# Patient Record
Sex: MALE | Race: WHITE | NOT HISPANIC OR LATINO | Employment: OTHER | URBAN - METROPOLITAN AREA
[De-identification: names, ages, dates, MRNs, and addresses within clinical notes are randomized per-mention and may not be internally consistent; named-entity substitution may affect disease eponyms.]

---

## 2017-02-20 ENCOUNTER — CLINICAL SUPPORT NO REQUIREMENTS (OUTPATIENT)
Dept: CARDIOLOGY | Facility: CLINIC | Age: 62
End: 2017-02-20

## 2017-02-20 ENCOUNTER — OFFICE VISIT (OUTPATIENT)
Dept: CARDIOLOGY | Facility: CLINIC | Age: 62
End: 2017-02-20

## 2017-02-20 VITALS
HEART RATE: 70 BPM | BODY MASS INDEX: 24.3 KG/M2 | DIASTOLIC BLOOD PRESSURE: 74 MMHG | HEIGHT: 78 IN | SYSTOLIC BLOOD PRESSURE: 114 MMHG | WEIGHT: 210 LBS

## 2017-02-20 DIAGNOSIS — Q87.40 MARFAN SYNDROME: ICD-10-CM

## 2017-02-20 DIAGNOSIS — R00.2 PALPITATIONS: ICD-10-CM

## 2017-02-20 DIAGNOSIS — I47.1 SUPRAVENTRICULAR TACHYCARDIA (HCC): Primary | ICD-10-CM

## 2017-02-20 DIAGNOSIS — Z95.818 STATUS POST PLACEMENT OF IMPLANTABLE LOOP RECORDER: Primary | ICD-10-CM

## 2017-02-20 PROCEDURE — 99213 OFFICE O/P EST LOW 20 MIN: CPT | Performed by: INTERNAL MEDICINE

## 2017-02-20 PROCEDURE — 93291 INTERROG DEV EVAL SCRMS IP: CPT | Performed by: INTERNAL MEDICINE

## 2017-02-20 NOTE — PROGRESS NOTES
" Subjective:       Bipin Green Jr. is a 61 y.o. male who here for follow up    CC  Palpitations  HPI  61-year-old white male with a history of palpitation, SVT, has a loop recorder which showed 10 minutes episode of SVT, with mild shortness of breath and palpitation, denies any chest pains or tightness in chest, SVT happened approximately 3-4 months ago     Problem List Items Addressed This Visit        Cardiovascular and Mediastinum    Supraventricular tachycardia - Primary    Relevant Orders    Treadmill Stress Test    Palpitations    Relevant Orders    Treadmill Stress Test       Other    Marfan syndrome    Relevant Orders    Treadmill Stress Test        Previous treatments/evaluations include: ASA and beta blocker. Cardiac risk factors: advanced age (older than 55 for men, 65 for women) and hypertension.    The following portions of the patient's history were reviewed and updated as appropriate: allergies, current medications, past family history, past medical history, past social history, past surgical history and problem list.    Past Medical History   Diagnosis Date   • History of loop recorder    • Marfan syndrome    • Palpitations    • PSVT (paroxysmal supraventricular tachycardia)    • PVC (premature ventricular contraction)    • SSS (sick sinus syndrome)    • Syncope     reports that he has never smoked. He has never used smokeless tobacco. He reports that he does not drink alcohol or use illicit drugs.  Family History   Problem Relation Age of Onset   • Family history unknown: Yes       Review of Systems  Constitutional: No wt loss, fever, fatigue  Gastrointestinal: No nausea, abdominal pain  Behavioral/Psych: No insomnia or anxiety   Cardiovascular palpitation  Objective:       Physical Exam             Physical Exam  Visit Vitals   • /74   • Pulse 70   • Ht 81\" (205.7 cm)   • Wt 210 lb (95.3 kg)   • BMI 22.5 kg/m2       General appearance: NAD, conversant   Eyes: anicteric sclerae, moist " conjunctivae; no lid-lag; PERRLA   HENT: Atraumatic; oropharynx clear with moist mucous membranes and no mucosal ulcerations;  normal hard and soft palate   Neck: Trachea midline; FROM, supple, no thyromegaly or lymphadenopathy   Lungs: CTA, with normal respiratory effort and no intercostal retractions   CV: S1-S2 regular, no murmurs, no rub, no gallop   Abdomen: Soft, non-tender; no masses or HSM   Extremities: No peripheral edema or extremity lymphadenopathy  Skin: Normal temperature, turgor and texture; no rash, ulcers or subcutaneous nodules   Psych: Appropriate affect, alert and oriented to person, place and time           Cardiographics  @Procedures    Echocardiogram:        Current Outpatient Prescriptions:   •  aspirin 81 MG tablet, Take  by mouth daily., Disp: , Rfl:   •  metoprolol succinate XL (TOPROL-XL) 25 MG 24 hr tablet, Take 1 tablet by mouth daily., Disp: 30 tablet, Rfl: 6  •  nitroglycerin (NITROSTAT) 0.4 MG SL tablet, Place  under the tongue., Disp: , Rfl:    Assessment:        Patient Active Problem List   Diagnosis   • Abnormal electrocardiogram   • Cardiac arrhythmia   • Supraventricular tachycardia   • Syncope   • PSVT (paroxysmal supraventricular tachycardia)   • Marfan syndrome   • Palpitations   There is no dilatation of the ascending aorta.   Conclusions  Borderline concentric left ventricular hypertrophy is observed.  The estimated ejection fraction is 60-65%.   Abnormal left ventricular diastolic filling is observed, consistent with  Grade 1 (impaired LV relaxation).  Trace to mild aortic regurgitation is present.  There is trace to mild mitral regurgitation observed.  There is trace to mild tricuspid regurgitation present.  There is no pericardial effusion.         CONCLUSION: Normal Lexiscan Cardiolite with no evidence of previous myocardial  infarction or the redistribution of ischemia with the ejection fraction of  close to approximately 60%           Plan:            ICD-10-CM  ICD-9-CM   1. Supraventricular tachycardia I47.1 427.89   2. Palpitations R00.2 785.1   3. Marfan syndrome Q87.40 759.82     ett for exercise induced arr.    See us 6 month    Loop recorder showed 10 mg SVT    IF epoisodes worse may need ablation  COUNSELING:    Bipin Green Jr.Counseling was given to patient for the following topics: diagnostic results, risk factor reductions, impressions, risks and benefits of treatment options and importance of treatment compliance .       SMOKING COUNSELING:    Counseling given: Not Answered      EMR Dragon/Transcription disclaimer:   Much of this encounter note is an electronic transcription/translation of spoken language to printed text. The electronic translation of spoken language may permit erroneous, or at times, nonsensical words or phrases to be inadvertently transcribed; Although I have reviewed the note for such errors, some may still exist.

## 2017-02-22 DIAGNOSIS — R94.31 ABNORMAL EKG: Primary | ICD-10-CM

## 2017-02-22 DIAGNOSIS — R00.2 PALPITATIONS: ICD-10-CM

## 2017-02-22 DIAGNOSIS — I47.1 SUPRAVENTRICULAR TACHYCARDIA (HCC): ICD-10-CM

## 2017-02-22 DIAGNOSIS — Q87.40 MARFAN SYNDROME: ICD-10-CM

## 2017-02-23 ENCOUNTER — APPOINTMENT (OUTPATIENT)
Dept: CARDIOLOGY | Facility: HOSPITAL | Age: 62
End: 2017-02-23
Attending: INTERNAL MEDICINE

## 2017-02-27 ENCOUNTER — HOSPITAL ENCOUNTER (OUTPATIENT)
Dept: CARDIOLOGY | Facility: HOSPITAL | Age: 62
Discharge: HOME | End: 2017-02-27
Attending: INTERNAL MEDICINE | Admitting: INTERNAL MEDICINE

## 2017-02-27 ENCOUNTER — APPOINTMENT (OUTPATIENT)
Dept: CARDIOLOGY | Facility: HOSPITAL | Age: 62
End: 2017-02-27
Attending: INTERNAL MEDICINE

## 2017-02-27 VITALS
HEART RATE: 74 BPM | DIASTOLIC BLOOD PRESSURE: 64 MMHG | HEIGHT: 78 IN | OXYGEN SATURATION: 98 % | WEIGHT: 210 LBS | RESPIRATION RATE: 18 BRPM | SYSTOLIC BLOOD PRESSURE: 114 MMHG | BODY MASS INDEX: 24.3 KG/M2

## 2017-02-27 DIAGNOSIS — I47.1 SUPRAVENTRICULAR TACHYCARDIA (HCC): ICD-10-CM

## 2017-02-27 DIAGNOSIS — Q87.40 MARFAN SYNDROME: ICD-10-CM

## 2017-02-27 DIAGNOSIS — R00.2 PALPITATIONS: ICD-10-CM

## 2017-02-27 DIAGNOSIS — R94.31 ABNORMAL EKG: ICD-10-CM

## 2017-02-27 PROCEDURE — 93016 CV STRESS TEST SUPVJ ONLY: CPT | Performed by: INTERNAL MEDICINE

## 2017-02-27 PROCEDURE — 93018 CV STRESS TEST I&R ONLY: CPT | Performed by: INTERNAL MEDICINE

## 2017-02-27 PROCEDURE — 93017 CV STRESS TEST TRACING ONLY: CPT

## 2017-02-28 LAB
BH CV STRESS BP STAGE 1: NORMAL
BH CV STRESS BP STAGE 2: NORMAL
BH CV STRESS BP STAGE 3: NORMAL
BH CV STRESS DURATION MIN STAGE 1: 3
BH CV STRESS DURATION MIN STAGE 2: 3
BH CV STRESS DURATION MIN STAGE 3: 0
BH CV STRESS DURATION SEC STAGE 1: 0
BH CV STRESS DURATION SEC STAGE 2: 0
BH CV STRESS DURATION SEC STAGE 3: 15
BH CV STRESS GRADE STAGE 1: 10
BH CV STRESS GRADE STAGE 2: 12
BH CV STRESS GRADE STAGE 3: 14
BH CV STRESS HR STAGE 1: 123
BH CV STRESS HR STAGE 2: 150
BH CV STRESS HR STAGE 3: 153
BH CV STRESS METS STAGE 1: 4.6
BH CV STRESS METS STAGE 2: 7
BH CV STRESS METS STAGE 3: 7.3
BH CV STRESS O2 STAGE 3: 99
BH CV STRESS PROTOCOL 1: NORMAL
BH CV STRESS RECOVERY BP: NORMAL MMHG
BH CV STRESS RECOVERY HR: 83 BPM
BH CV STRESS RECOVERY O2: 98 %
BH CV STRESS SPEED STAGE 1: 1.7
BH CV STRESS SPEED STAGE 2: 2.5
BH CV STRESS SPEED STAGE 3: 3.4
BH CV STRESS STAGE 1: 1
BH CV STRESS STAGE 2: 2
BH CV STRESS STAGE 3: 3
MAXIMAL PREDICTED HEART RATE: 158 BPM
PERCENT MAX PREDICTED HR: 96.84 %
STRESS BASELINE BP: NORMAL MMHG
STRESS BASELINE HR: 76 BPM
STRESS O2 SAT REST: 98 %
STRESS PERCENT HR: 114 %
STRESS POST ESTIMATED WORKLOAD: 7.3 METS
STRESS POST EXERCISE DUR MIN: 6 MIN
STRESS POST EXERCISE DUR SEC: 15 SEC
STRESS POST O2 SAT PEAK: 99 %
STRESS POST PEAK BP: NORMAL MMHG
STRESS POST PEAK HR: 153 BPM
STRESS TARGET HR: 134 BPM

## 2017-05-28 ENCOUNTER — CLINICAL SUPPORT NO REQUIREMENTS (OUTPATIENT)
Dept: CARDIOLOGY | Facility: CLINIC | Age: 62
End: 2017-05-28

## 2017-05-28 DIAGNOSIS — Z95.818 PRESENCE OF ELECTRONIC CARDIAC DEVICE: Primary | ICD-10-CM

## 2017-05-28 PROCEDURE — 93299 PR REM INTERROG ICPMS/SCRMS <30 D TECH REVIEW: CPT | Performed by: INTERNAL MEDICINE

## 2017-05-28 PROCEDURE — 93298 REM INTERROG DEV EVAL SCRMS: CPT | Performed by: INTERNAL MEDICINE

## 2017-05-30 RX ORDER — METOPROLOL SUCCINATE 50 MG/1
50 TABLET, EXTENDED RELEASE ORAL DAILY
Qty: 30 TABLET | Refills: 11 | Status: SHIPPED | OUTPATIENT
Start: 2017-05-30 | End: 2018-06-06 | Stop reason: SDUPTHER

## 2017-05-30 RX ORDER — METOPROLOL SUCCINATE 50 MG/1
TABLET, EXTENDED RELEASE ORAL
Qty: 30 TABLET | Refills: 0 | Status: SHIPPED | OUTPATIENT
Start: 2017-05-30 | End: 2017-05-30 | Stop reason: SDUPTHER

## 2017-07-11 ENCOUNTER — CLINICAL SUPPORT NO REQUIREMENTS (OUTPATIENT)
Dept: CARDIOLOGY | Facility: CLINIC | Age: 62
End: 2017-07-11

## 2017-07-11 DIAGNOSIS — Z95.818 STATUS POST PLACEMENT OF IMPLANTABLE LOOP RECORDER: Primary | ICD-10-CM

## 2017-07-11 PROCEDURE — 93298 REM INTERROG DEV EVAL SCRMS: CPT | Performed by: INTERNAL MEDICINE

## 2017-07-11 PROCEDURE — 93299 PR REM INTERROG ICPMS/SCRMS <30 D TECH REVIEW: CPT | Performed by: INTERNAL MEDICINE

## 2017-08-10 ENCOUNTER — CLINICAL SUPPORT NO REQUIREMENTS (OUTPATIENT)
Dept: CARDIOLOGY | Facility: CLINIC | Age: 62
End: 2017-08-10

## 2017-08-10 DIAGNOSIS — Z95.818 STATUS POST PLACEMENT OF IMPLANTABLE LOOP RECORDER: Primary | ICD-10-CM

## 2017-08-10 PROCEDURE — 93298 REM INTERROG DEV EVAL SCRMS: CPT | Performed by: INTERNAL MEDICINE

## 2017-08-10 PROCEDURE — 93299 PR REM INTERROG ICPMS/SCRMS <30 D TECH REVIEW: CPT | Performed by: INTERNAL MEDICINE

## 2017-09-15 ENCOUNTER — CLINICAL SUPPORT NO REQUIREMENTS (OUTPATIENT)
Dept: CARDIOLOGY | Facility: CLINIC | Age: 62
End: 2017-09-15

## 2017-09-15 DIAGNOSIS — Z95.818 PRESENCE OF CARDIAC DEVICE: Primary | ICD-10-CM

## 2017-09-15 PROCEDURE — 93298 REM INTERROG DEV EVAL SCRMS: CPT | Performed by: INTERNAL MEDICINE

## 2017-11-03 ENCOUNTER — CLINICAL SUPPORT NO REQUIREMENTS (OUTPATIENT)
Dept: CARDIOLOGY | Facility: CLINIC | Age: 62
End: 2017-11-03

## 2017-11-03 DIAGNOSIS — Z95.818 PRESENCE OF CARDIAC DEVICE: Primary | ICD-10-CM

## 2017-11-03 PROCEDURE — 93298 REM INTERROG DEV EVAL SCRMS: CPT | Performed by: INTERNAL MEDICINE

## 2017-12-03 ENCOUNTER — CLINICAL SUPPORT NO REQUIREMENTS (OUTPATIENT)
Dept: CARDIOLOGY | Facility: CLINIC | Age: 62
End: 2017-12-03

## 2017-12-03 DIAGNOSIS — Z95.818 PRESENCE OF CARDIAC DEVICE: Primary | ICD-10-CM

## 2017-12-03 PROCEDURE — 93298 REM INTERROG DEV EVAL SCRMS: CPT | Performed by: INTERNAL MEDICINE

## 2018-01-02 ENCOUNTER — CLINICAL SUPPORT NO REQUIREMENTS (OUTPATIENT)
Dept: CARDIOLOGY | Facility: CLINIC | Age: 63
End: 2018-01-02

## 2018-01-02 DIAGNOSIS — Z95.818 STATUS POST PLACEMENT OF IMPLANTABLE LOOP RECORDER: Primary | ICD-10-CM

## 2018-01-02 PROCEDURE — 93299 PR REM INTERROG ICPMS/SCRMS <30 D TECH REVIEW: CPT | Performed by: INTERNAL MEDICINE

## 2018-01-02 PROCEDURE — 93298 REM INTERROG DEV EVAL SCRMS: CPT | Performed by: INTERNAL MEDICINE

## 2018-02-01 ENCOUNTER — CLINICAL SUPPORT NO REQUIREMENTS (OUTPATIENT)
Dept: CARDIOLOGY | Facility: CLINIC | Age: 63
End: 2018-02-01

## 2018-02-01 DIAGNOSIS — Z95.818 STATUS POST PLACEMENT OF IMPLANTABLE LOOP RECORDER: Primary | ICD-10-CM

## 2018-02-01 PROCEDURE — 93299 PR REM INTERROG ICPMS/SCRMS <30 D TECH REVIEW: CPT | Performed by: INTERNAL MEDICINE

## 2018-02-01 PROCEDURE — 93298 REM INTERROG DEV EVAL SCRMS: CPT | Performed by: INTERNAL MEDICINE

## 2018-02-05 PROBLEM — Z95.818 STATUS POST PLACEMENT OF IMPLANTABLE LOOP RECORDER: Status: ACTIVE | Noted: 2018-02-05

## 2018-03-03 ENCOUNTER — CLINICAL SUPPORT NO REQUIREMENTS (OUTPATIENT)
Dept: CARDIOLOGY | Facility: CLINIC | Age: 63
End: 2018-03-03

## 2018-03-03 DIAGNOSIS — Z95.818 STATUS POST PLACEMENT OF IMPLANTABLE LOOP RECORDER: Primary | ICD-10-CM

## 2018-03-03 PROCEDURE — 93299 PR REM INTERROG ICPMS/SCRMS <30 D TECH REVIEW: CPT | Performed by: INTERNAL MEDICINE

## 2018-03-03 PROCEDURE — 93298 REM INTERROG DEV EVAL SCRMS: CPT | Performed by: INTERNAL MEDICINE

## 2018-04-06 ENCOUNTER — CLINICAL SUPPORT NO REQUIREMENTS (OUTPATIENT)
Dept: CARDIOLOGY | Facility: CLINIC | Age: 63
End: 2018-04-06

## 2018-04-06 DIAGNOSIS — Z95.818 STATUS POST PLACEMENT OF IMPLANTABLE LOOP RECORDER: Primary | ICD-10-CM

## 2018-04-06 PROCEDURE — 93298 REM INTERROG DEV EVAL SCRMS: CPT | Performed by: INTERNAL MEDICINE

## 2018-04-06 PROCEDURE — 93299 PR REM INTERROG ICPMS/SCRMS <30 D TECH REVIEW: CPT | Performed by: INTERNAL MEDICINE

## 2018-05-06 ENCOUNTER — CLINICAL SUPPORT NO REQUIREMENTS (OUTPATIENT)
Dept: CARDIOLOGY | Facility: CLINIC | Age: 63
End: 2018-05-06

## 2018-05-06 DIAGNOSIS — Z45.09 ENCOUNTER FOR LOOP RECORDER CHECK: Primary | ICD-10-CM

## 2018-05-06 PROCEDURE — 93298 REM INTERROG DEV EVAL SCRMS: CPT | Performed by: INTERNAL MEDICINE

## 2018-05-06 PROCEDURE — 93299 PR REM INTERROG ICPMS/SCRMS <30 D TECH REVIEW: CPT | Performed by: INTERNAL MEDICINE

## 2018-05-07 PROBLEM — Z45.09 ENCOUNTER FOR LOOP RECORDER CHECK: Status: ACTIVE | Noted: 2018-05-07

## 2018-06-04 RX ORDER — METOPROLOL SUCCINATE 50 MG/1
TABLET, EXTENDED RELEASE ORAL
Qty: 30 TABLET | Refills: 11 | OUTPATIENT
Start: 2018-06-04

## 2018-06-05 ENCOUNTER — CLINICAL SUPPORT NO REQUIREMENTS (OUTPATIENT)
Dept: CARDIOLOGY | Facility: CLINIC | Age: 63
End: 2018-06-05

## 2018-06-05 DIAGNOSIS — Z95.818 STATUS POST PLACEMENT OF IMPLANTABLE LOOP RECORDER: Primary | ICD-10-CM

## 2018-06-05 PROCEDURE — 93298 REM INTERROG DEV EVAL SCRMS: CPT | Performed by: INTERNAL MEDICINE

## 2018-06-05 PROCEDURE — 93299 PR REM INTERROG ICPMS/SCRMS <30 D TECH REVIEW: CPT | Performed by: INTERNAL MEDICINE

## 2018-06-06 RX ORDER — METOPROLOL SUCCINATE 50 MG/1
TABLET, EXTENDED RELEASE ORAL
Qty: 30 TABLET | Refills: 11 | OUTPATIENT
Start: 2018-06-06

## 2018-06-06 RX ORDER — METOPROLOL SUCCINATE 50 MG/1
50 TABLET, EXTENDED RELEASE ORAL DAILY
Qty: 30 TABLET | Refills: 0 | Status: SHIPPED | OUTPATIENT
Start: 2018-06-06 | End: 2018-07-02 | Stop reason: SDUPTHER

## 2018-06-18 ENCOUNTER — OFFICE VISIT (OUTPATIENT)
Dept: CARDIOLOGY | Facility: CLINIC | Age: 63
End: 2018-06-18

## 2018-06-18 VITALS
WEIGHT: 206 LBS | BODY MASS INDEX: 23.83 KG/M2 | HEIGHT: 78 IN | DIASTOLIC BLOOD PRESSURE: 64 MMHG | SYSTOLIC BLOOD PRESSURE: 108 MMHG | HEART RATE: 59 BPM

## 2018-06-18 DIAGNOSIS — R07.2 PRECORDIAL PAIN: ICD-10-CM

## 2018-06-18 DIAGNOSIS — Z95.818 STATUS POST PLACEMENT OF IMPLANTABLE LOOP RECORDER: Primary | ICD-10-CM

## 2018-06-18 DIAGNOSIS — R00.2 PALPITATIONS: ICD-10-CM

## 2018-06-18 PROCEDURE — 99213 OFFICE O/P EST LOW 20 MIN: CPT | Performed by: INTERNAL MEDICINE

## 2018-06-18 PROCEDURE — 93000 ELECTROCARDIOGRAM COMPLETE: CPT | Performed by: INTERNAL MEDICINE

## 2018-06-18 NOTE — PROGRESS NOTES
" Subjective:       Bipin Green Jr. is a 62 y.o. male who here for follow up    CC  STILL HAVE OCCASIONAL CP    OCCASIONAL PALPITATTION DURING SLEEP , AND TURNING ON RIGHT SIDE STOPPED PALPITATION  HPI  62-year-old male still have occasional chest pain and palpitation has a loop recorder implanted    Chest pains mild associated palpitations     Problem List Items Addressed This Visit        Cardiovascular and Mediastinum    Palpitations       Nervous and Auditory    Precordial pain       Other    Status post placement of implantable loop recorder - Primary        .    The following portions of the patient's history were reviewed and updated as appropriate: allergies, current medications, past family history, past medical history, past social history, past surgical history and problem list.    Past Medical History:   Diagnosis Date   • Arrhythmia     PSVT, has loop recorder implant   • History of loop recorder    • Marfan syndrome    • Palpitations    • PSVT (paroxysmal supraventricular tachycardia)    • PVC (premature ventricular contraction)    • SSS (sick sinus syndrome)    • Syncope    • Vision decreased     reports that he has never smoked. He has never used smokeless tobacco. He reports that he does not drink alcohol or use drugs.  Family History   Problem Relation Age of Onset   • Heart disease Mother    • Cancer Father    • Heart disease Sister    • Atrial fibrillation Sister        Review of Systems  Constitutional: No wt loss, fever, fatigue  Gastrointestinal: No nausea, abdominal pain  Behavioral/Psych: No insomnia or anxiety   Cardiovascular Chest pain and palpitation  Objective:       Physical Exam           Physical Exam  /64 (BP Location: Left arm, Patient Position: Sitting)   Pulse 59   Ht 205.7 cm (81\")   Wt 93.4 kg (206 lb)   BMI 22.07 kg/m²     General appearance: NAD, conversant   Eyes: anicteric sclerae, moist conjunctivae; no lid-lag; PERRLA   HENT: Atraumatic; oropharynx clear with " moist mucous membranes and no mucosal ulcerations;  normal hard and soft palate   Neck: Trachea midline; FROM, supple, no thyromegaly or lymphadenopathy   Lungs: CTA, with normal respiratory effort and no intercostal retractions   CV: S1-S2 regular, no murmurs, no rub, no gallop   Abdomen: Soft, non-tender; no masses or HSM   Extremities: No peripheral edema or extremity lymphadenopathy  Skin: Normal temperature, turgor and texture; no rash, ulcers or subcutaneous nodules   Psych: Appropriate affect, alert and oriented to person, place and time           Cardiographics  @  ECG 12 Lead  Date/Time: 6/18/2018 1:56 PM  Performed by: RAYSA LOPEZ  Authorized by: RAYSA LOPEZ   Comparison: compared with previous ECG   Similar to previous ECG  Rhythm: sinus rhythm  ST Flattening: all  Clinical impression: non-specific ECG            Echocardiogram:        Current Outpatient Prescriptions:   •  aspirin 81 MG tablet, Take 81 mg by mouth 2 (Two) Times a Day., Disp: , Rfl:   •  metoprolol succinate XL (TOPROL-XL) 50 MG 24 hr tablet, Take 1 tablet by mouth Daily., Disp: 30 tablet, Rfl: 0  •  nitroglycerin (NITROSTAT) 0.4 MG SL tablet, Place  under the tongue., Disp: , Rfl:    Assessment:        Patient Active Problem List   Diagnosis   • Abnormal electrocardiogram   • Cardiac arrhythmia   • Supraventricular tachycardia   • Syncope   • PSVT (paroxysmal supraventricular tachycardia)   • Marfan syndrome   • Palpitations   • Status post placement of implantable loop recorder   • Encounter for loop recorder check               Plan:            ICD-10-CM ICD-9-CM   1. Status post placement of implantable loop recorder Z95.818 V45.09   2. Palpitations R00.2 785.1   3. Precordial pain R07.2 786.51     1. Status post placement of implantable loop recorder  No arr.    2. Palpitations  Under control    3. Precordial pain  Workup is negative       CV STABLE    CON BETA BLOCKERS    SEE US 1 YR  COUNSELING:    Bipin Green  .Counseling was given to patient for the following topics: diagnostic results, risk factor reductions, impressions, risks and benefits of treatment options and importance of treatment compliance .       SMOKING COUNSELING:    Counseling given: Not Answered      EMR Dragon/Transcription disclaimer:   Much of this encounter note is an electronic transcription/translation of spoken language to printed text. The electronic translation of spoken language may permit erroneous, or at times, nonsensical words or phrases to be inadvertently transcribed; Although I have reviewed the note for such errors, some may still exist.

## 2018-06-22 PROBLEM — R07.2 PRECORDIAL PAIN: Status: ACTIVE | Noted: 2018-06-22

## 2018-07-02 RX ORDER — METOPROLOL SUCCINATE 50 MG/1
50 TABLET, EXTENDED RELEASE ORAL DAILY
Qty: 30 TABLET | Refills: 3 | Status: SHIPPED | OUTPATIENT
Start: 2018-07-02 | End: 2018-10-09 | Stop reason: SDUPTHER

## 2018-07-05 ENCOUNTER — CLINICAL SUPPORT NO REQUIREMENTS (OUTPATIENT)
Dept: CARDIOLOGY | Facility: CLINIC | Age: 63
End: 2018-07-05

## 2018-07-05 DIAGNOSIS — Z95.818 STATUS POST PLACEMENT OF IMPLANTABLE LOOP RECORDER: Primary | ICD-10-CM

## 2018-07-05 PROCEDURE — 93298 REM INTERROG DEV EVAL SCRMS: CPT | Performed by: INTERNAL MEDICINE

## 2018-07-05 PROCEDURE — 93299 PR REM INTERROG ICPMS/SCRMS <30 D TECH REVIEW: CPT | Performed by: INTERNAL MEDICINE

## 2018-08-05 ENCOUNTER — CLINICAL SUPPORT NO REQUIREMENTS (OUTPATIENT)
Dept: CARDIOLOGY | Facility: CLINIC | Age: 63
End: 2018-08-05

## 2018-08-05 DIAGNOSIS — Z95.818 STATUS POST PLACEMENT OF IMPLANTABLE LOOP RECORDER: Primary | ICD-10-CM

## 2018-08-05 PROCEDURE — 93299 PR REM INTERROG ICPMS/SCRMS <30 D TECH REVIEW: CPT | Performed by: INTERNAL MEDICINE

## 2018-08-05 PROCEDURE — 93298 REM INTERROG DEV EVAL SCRMS: CPT | Performed by: INTERNAL MEDICINE

## 2018-09-04 ENCOUNTER — CLINICAL SUPPORT NO REQUIREMENTS (OUTPATIENT)
Dept: CARDIOLOGY | Facility: CLINIC | Age: 63
End: 2018-09-04

## 2018-09-04 ENCOUNTER — TELEPHONE (OUTPATIENT)
Dept: CARDIOLOGY | Facility: CLINIC | Age: 63
End: 2018-09-04

## 2018-09-04 DIAGNOSIS — Z95.818 STATUS POST PLACEMENT OF IMPLANTABLE LOOP RECORDER: ICD-10-CM

## 2018-09-04 DIAGNOSIS — Z45.09 ENCOUNTER FOR LOOP RECORDER CHECK: Primary | ICD-10-CM

## 2018-09-04 PROCEDURE — 93299 PR REM INTERROG ICPMS/SCRMS <30 D TECH REVIEW: CPT | Performed by: INTERNAL MEDICINE

## 2018-09-04 PROCEDURE — 93298 REM INTERROG DEV EVAL SCRMS: CPT | Performed by: INTERNAL MEDICINE

## 2018-09-04 NOTE — TELEPHONE ENCOUNTER
DIANA FOR PATIENT TO CALL OFFICE.  [employer] [facility] HAS REACHED [facility].  NEEDS AN OFFICE VISIT TO DISCUSS OPTIONS WITH DR. HUERTA

## 2018-10-02 ENCOUNTER — TRANSCRIBE ORDERS (OUTPATIENT)
Dept: ADMINISTRATIVE | Facility: HOSPITAL | Age: 63
End: 2018-10-02

## 2018-10-02 DIAGNOSIS — C61 PROSTATE CANCER (HCC): Primary | ICD-10-CM

## 2018-10-04 ENCOUNTER — HOSPITAL ENCOUNTER (OUTPATIENT)
Dept: NUCLEAR MEDICINE | Facility: HOSPITAL | Age: 63
Discharge: HOME | End: 2018-10-04
Attending: UROLOGY

## 2018-10-04 ENCOUNTER — CLINICAL SUPPORT NO REQUIREMENTS (OUTPATIENT)
Dept: CARDIOLOGY | Facility: CLINIC | Age: 63
End: 2018-10-04

## 2018-10-04 DIAGNOSIS — Z95.818 STATUS POST PLACEMENT OF IMPLANTABLE LOOP RECORDER: Primary | ICD-10-CM

## 2018-10-04 DIAGNOSIS — C61 PROSTATE CANCER (HCC): ICD-10-CM

## 2018-10-04 PROCEDURE — A9503 TC99M MEDRONATE: HCPCS | Performed by: UROLOGY

## 2018-10-04 PROCEDURE — 93299 PR REM INTERROG ICPMS/SCRMS <30 D TECH REVIEW: CPT | Performed by: INTERNAL MEDICINE

## 2018-10-04 PROCEDURE — 78306 BONE IMAGING WHOLE BODY: CPT

## 2018-10-04 PROCEDURE — 93298 REM INTERROG DEV EVAL SCRMS: CPT | Performed by: INTERNAL MEDICINE

## 2018-10-04 PROCEDURE — 0 TECHNETIUM MEDRONATE KIT: Performed by: UROLOGY

## 2018-10-04 RX ORDER — TC 99M MEDRONATE 20 MG/10ML
22.7 INJECTION, POWDER, LYOPHILIZED, FOR SOLUTION INTRAVENOUS
Status: COMPLETED | OUTPATIENT
Start: 2018-10-04 | End: 2018-10-04

## 2018-10-04 RX ADMIN — Medication 22.7 MILLICURIE: at 08:19

## 2018-10-09 RX ORDER — METOPROLOL SUCCINATE 50 MG/1
50 TABLET, EXTENDED RELEASE ORAL DAILY
Qty: 30 TABLET | Refills: 8 | Status: SHIPPED | OUTPATIENT
Start: 2018-10-09 | End: 2019-08-19 | Stop reason: SDUPTHER

## 2018-11-27 ENCOUNTER — APPOINTMENT (OUTPATIENT)
Dept: PREADMISSION TESTING | Facility: HOSPITAL | Age: 63
End: 2018-11-27

## 2019-01-03 ENCOUNTER — OFFICE VISIT (OUTPATIENT)
Dept: CARDIOLOGY | Facility: CLINIC | Age: 64
End: 2019-01-03

## 2019-01-03 VITALS
BODY MASS INDEX: 24.76 KG/M2 | SYSTOLIC BLOOD PRESSURE: 115 MMHG | WEIGHT: 214 LBS | HEIGHT: 78 IN | DIASTOLIC BLOOD PRESSURE: 67 MMHG | HEART RATE: 64 BPM

## 2019-01-03 DIAGNOSIS — R07.2 PRECORDIAL PAIN: ICD-10-CM

## 2019-01-03 DIAGNOSIS — R00.2 PALPITATIONS: Primary | ICD-10-CM

## 2019-01-03 DIAGNOSIS — R55 SYNCOPE, UNSPECIFIED SYNCOPE TYPE: ICD-10-CM

## 2019-01-03 PROCEDURE — 99214 OFFICE O/P EST MOD 30 MIN: CPT | Performed by: INTERNAL MEDICINE

## 2019-01-03 NOTE — H&P (VIEW-ONLY)
" Subjective:        Bipin Green Jr. is a 63 y.o. male who here for follow up    CC  Occasional palpitation    Loop recorder [facility]  HPI  63-year-old male with known history of the palpitation as well as syncope, had a loop recorder which is at [facility]     Problem List Items Addressed This Visit        Cardiovascular and Mediastinum    Syncope    Palpitations - Primary    Relevant Orders    Case Request Cath Lab: Loop recorder removal (Completed)    Treadmill Stress Test       Nervous and Auditory    Precordial pain    Relevant Orders    Case Request Cath Lab: Loop recorder removal (Completed)    Treadmill Stress Test        .    The following portions of the patient's history were reviewed and updated as appropriate: allergies, current medications, past family history, past medical history, past social history, past surgical history and problem list.    Past Medical History:   Diagnosis Date   • Arrhythmia     PSVT, has loop recorder implant   • History of loop recorder    • Marfan syndrome    • Palpitations    • PSVT (paroxysmal supraventricular tachycardia) (CMS/HCC)    • PVC (premature ventricular contraction)    • SSS (sick sinus syndrome) (CMS/HCC)    • Syncope    • Vision decreased      reports that  has never smoked. he has never used smokeless tobacco. He reports that he does not drink alcohol or use drugs.   Family History   Problem Relation Age of Onset   • Heart disease Mother    • Cancer Father    • Heart disease Sister    • Atrial fibrillation Sister        Review of Systems  Constitutional: No wt loss, fever, fatigue  Gastrointestinal: No nausea, abdominal pain  Behavioral/Psych: No insomnia or anxiety   Cardiovascular no chest pains or tightness in chest  Objective:                 Physical Exam  /67 (BP Location: Left arm, Patient Position: Sitting)   Pulse 64   Ht 205.7 cm (81\")   Wt 97.1 kg (214 lb)   BMI 22.93 kg/m²     General appearance: NAD, conversant   Eyes: anicteric sclerae, moist " conjunctivae; no lid-lag; PERRLA   HENT: Atraumatic; oropharynx clear with moist mucous membranes and no mucosal ulcerations;  normal hard and soft palate   Neck: Trachea midline; FROM, supple, no thyromegaly or lymphadenopathy   Lungs: CTA, with normal respiratory effort and no intercostal retractions   CV: S1-S2 regular, no murmurs, no rub, no gallop   Abdomen: Soft, non-tender; no masses or HSM   Extremities: No peripheral edema or extremity lymphadenopathy  Skin: Normal temperature, turgor and texture; no rash, ulcers or subcutaneous nodules   Psych: Appropriate affect, alert and oriented to person, place and time           Procedures      Echocardiogram:        Current Outpatient Medications:   •  aspirin 81 MG tablet, Take 81 mg by mouth 2 (Two) Times a Day., Disp: , Rfl:   •  metoprolol succinate XL (TOPROL-XL) 50 MG 24 hr tablet, Take 1 tablet by mouth Daily., Disp: 30 tablet, Rfl: 8   Assessment:        Patient Active Problem List   Diagnosis   • Abnormal electrocardiogram   • Cardiac arrhythmia   • Supraventricular tachycardia (CMS/HCC)   • Syncope   • PSVT (paroxysmal supraventricular tachycardia) (CMS/HCC)   • Marfan syndrome   • Palpitations   • Status post placement of implantable loop recorder   • Encounter for loop recorder check   • Precordial pain               Plan:            ICD-10-CM ICD-9-CM   1. Palpitations R00.2 785.1   2. Precordial pain R07.2 786.51   3. Syncope, unspecified syncope type R55 780.2     1. Palpitations  Occasional  - Case Request Cath Lab: Loop recorder removal  - Treadmill Stress Test    2. Precordial pain  Atypical  - Case Request Cath Lab: Loop recorder removal  - Treadmill Stress Test    3. Syncope, unspecified syncope type  No more       ETT,   LOOP RECORDER, removal procedure risk and options explained  COUNSELING:    Bipin Green Jr.Counseling was given to patient for the following topics: diagnostic results, risk factor reductions, impressions, risks and benefits of  treatment options and importance of treatment compliance .       SMOKING COUNSELING:    Counseling given: Not Answered      Dictated using Dragon dictation

## 2019-01-23 ENCOUNTER — HOSPITAL ENCOUNTER (OUTPATIENT)
Facility: HOSPITAL | Age: 64
Setting detail: HOSPITAL OUTPATIENT SURGERY
Discharge: HOME | End: 2019-01-23
Attending: INTERNAL MEDICINE | Admitting: INTERNAL MEDICINE

## 2019-01-23 VITALS
TEMPERATURE: 98 F | BODY MASS INDEX: 24.88 KG/M2 | DIASTOLIC BLOOD PRESSURE: 70 MMHG | SYSTOLIC BLOOD PRESSURE: 118 MMHG | OXYGEN SATURATION: 96 % | HEIGHT: 78 IN | WEIGHT: 215 LBS | HEART RATE: 61 BPM | RESPIRATION RATE: 18 BRPM

## 2019-01-23 DIAGNOSIS — R07.2 PRECORDIAL PAIN: ICD-10-CM

## 2019-01-23 DIAGNOSIS — R00.2 PALPITATIONS: ICD-10-CM

## 2019-01-23 PROCEDURE — 25010000002 MIDAZOLAM PER 1 MG: Performed by: INTERNAL MEDICINE

## 2019-01-23 PROCEDURE — 25010000002 FENTANYL CITRATE (PF) 100 MCG/2ML SOLUTION: Performed by: INTERNAL MEDICINE

## 2019-01-23 PROCEDURE — 33286 RMVL SUBQ CAR RHYTHM MNTR: CPT | Performed by: INTERNAL MEDICINE

## 2019-01-23 PROCEDURE — 25010000003 CEFAZOLIN 1-4 GM/50ML-% SOLUTION: Performed by: INTERNAL MEDICINE

## 2019-01-23 RX ORDER — SODIUM CHLORIDE 0.9 % (FLUSH) 0.9 %
3-10 SYRINGE (ML) INJECTION AS NEEDED
Status: DISCONTINUED | OUTPATIENT
Start: 2019-01-23 | End: 2019-01-23 | Stop reason: HOSPADM

## 2019-01-23 RX ORDER — CEFAZOLIN SODIUM 1 G/50ML
INJECTION, SOLUTION INTRAVENOUS CONTINUOUS PRN
Status: COMPLETED | OUTPATIENT
Start: 2019-01-23 | End: 2019-01-23

## 2019-01-23 RX ORDER — FENTANYL CITRATE 50 UG/ML
INJECTION, SOLUTION INTRAMUSCULAR; INTRAVENOUS AS NEEDED
Status: DISCONTINUED | OUTPATIENT
Start: 2019-01-23 | End: 2019-01-23 | Stop reason: HOSPADM

## 2019-01-23 RX ORDER — SODIUM CHLORIDE 0.9 % (FLUSH) 0.9 %
3 SYRINGE (ML) INJECTION EVERY 12 HOURS SCHEDULED
Status: DISCONTINUED | OUTPATIENT
Start: 2019-01-23 | End: 2019-01-23 | Stop reason: HOSPADM

## 2019-01-23 RX ORDER — LIDOCAINE HYDROCHLORIDE 10 MG/ML
0.1 INJECTION, SOLUTION EPIDURAL; INFILTRATION; INTRACAUDAL; PERINEURAL ONCE AS NEEDED
Status: DISCONTINUED | OUTPATIENT
Start: 2019-01-23 | End: 2019-01-23 | Stop reason: HOSPADM

## 2019-01-23 RX ORDER — LIDOCAINE HYDROCHLORIDE AND EPINEPHRINE 10; 10 MG/ML; UG/ML
INJECTION, SOLUTION INFILTRATION; PERINEURAL AS NEEDED
Status: DISCONTINUED | OUTPATIENT
Start: 2019-01-23 | End: 2019-01-23 | Stop reason: HOSPADM

## 2019-01-23 RX ORDER — SODIUM CHLORIDE 9 MG/ML
INJECTION, SOLUTION INTRAVENOUS CONTINUOUS PRN
Status: COMPLETED | OUTPATIENT
Start: 2019-01-23 | End: 2019-01-23

## 2019-01-23 RX ORDER — MIDAZOLAM HYDROCHLORIDE 1 MG/ML
INJECTION INTRAMUSCULAR; INTRAVENOUS AS NEEDED
Status: DISCONTINUED | OUTPATIENT
Start: 2019-01-23 | End: 2019-01-23 | Stop reason: HOSPADM

## 2019-01-31 ENCOUNTER — OFFICE VISIT (OUTPATIENT)
Dept: CARDIOLOGY | Facility: CLINIC | Age: 64
End: 2019-01-31

## 2019-01-31 ENCOUNTER — HOSPITAL ENCOUNTER (OUTPATIENT)
Dept: CARDIOLOGY | Facility: HOSPITAL | Age: 64
Discharge: HOME | End: 2019-01-31
Attending: INTERNAL MEDICINE | Admitting: INTERNAL MEDICINE

## 2019-01-31 VITALS
BODY MASS INDEX: 24.88 KG/M2 | HEART RATE: 103 BPM | DIASTOLIC BLOOD PRESSURE: 70 MMHG | SYSTOLIC BLOOD PRESSURE: 128 MMHG | HEIGHT: 78 IN | WEIGHT: 215 LBS

## 2019-01-31 VITALS
DIASTOLIC BLOOD PRESSURE: 72 MMHG | SYSTOLIC BLOOD PRESSURE: 111 MMHG | WEIGHT: 214 LBS | HEIGHT: 78 IN | HEART RATE: 105 BPM | BODY MASS INDEX: 24.76 KG/M2

## 2019-01-31 DIAGNOSIS — R07.2 PRECORDIAL PAIN: ICD-10-CM

## 2019-01-31 DIAGNOSIS — R00.2 PALPITATIONS: ICD-10-CM

## 2019-01-31 DIAGNOSIS — R55 SYNCOPE, UNSPECIFIED SYNCOPE TYPE: Primary | ICD-10-CM

## 2019-01-31 PROCEDURE — 93018 CV STRESS TEST I&R ONLY: CPT | Performed by: INTERNAL MEDICINE

## 2019-01-31 PROCEDURE — 99212 OFFICE O/P EST SF 10 MIN: CPT | Performed by: INTERNAL MEDICINE

## 2019-01-31 PROCEDURE — 93017 CV STRESS TEST TRACING ONLY: CPT

## 2019-01-31 PROCEDURE — 93016 CV STRESS TEST SUPVJ ONLY: CPT | Performed by: INTERNAL MEDICINE

## 2019-02-01 LAB
BH CV STRESS BP STAGE 1: NORMAL
BH CV STRESS BP STAGE 2: NORMAL
BH CV STRESS DURATION MIN STAGE 1: 3
BH CV STRESS DURATION MIN STAGE 2: 3
BH CV STRESS DURATION MIN STAGE 3: 0
BH CV STRESS DURATION SEC STAGE 1: 0
BH CV STRESS DURATION SEC STAGE 2: 0
BH CV STRESS DURATION SEC STAGE 3: 31
BH CV STRESS GRADE STAGE 1: 10
BH CV STRESS GRADE STAGE 2: 12
BH CV STRESS GRADE STAGE 3: 14
BH CV STRESS HR STAGE 1: 138
BH CV STRESS HR STAGE 2: 158
BH CV STRESS HR STAGE 3: 158
BH CV STRESS METS STAGE 1: 4.6
BH CV STRESS METS STAGE 2: 6.6
BH CV STRESS METS STAGE 3: 7
BH CV STRESS PROTOCOL 1: NORMAL
BH CV STRESS RECOVERY BP: NORMAL MMHG
BH CV STRESS RECOVERY HR: 104 BPM
BH CV STRESS SPEED STAGE 1: 1.7
BH CV STRESS SPEED STAGE 2: 2.4
BH CV STRESS SPEED STAGE 3: 3.3
BH CV STRESS STAGE 1: 1
BH CV STRESS STAGE 2: 2
BH CV STRESS STAGE 3: 3
MAXIMAL PREDICTED HEART RATE: 156 BPM
PERCENT MAX PREDICTED HR: 103.85 %
STRESS BASELINE BP: NORMAL MMHG
STRESS BASELINE HR: 94 BPM
STRESS PERCENT HR: 122 %
STRESS POST ESTIMATED WORKLOAD: 7.2 METS
STRESS POST EXERCISE DUR MIN: 6 MIN
STRESS POST EXERCISE DUR SEC: 31 SEC
STRESS POST PEAK BP: NORMAL MMHG
STRESS POST PEAK HR: 162 BPM
STRESS TARGET HR: 133 BPM

## 2019-08-21 RX ORDER — METOPROLOL SUCCINATE 50 MG/1
TABLET, EXTENDED RELEASE ORAL
Qty: 30 TABLET | Refills: 8 | Status: SHIPPED | OUTPATIENT
Start: 2019-08-21 | End: 2020-02-05 | Stop reason: SDUPTHER

## 2020-01-30 ENCOUNTER — OFFICE VISIT (OUTPATIENT)
Dept: CARDIOLOGY | Facility: CLINIC | Age: 65
End: 2020-01-30

## 2020-01-30 VITALS
BODY MASS INDEX: 25.8 KG/M2 | HEIGHT: 78 IN | DIASTOLIC BLOOD PRESSURE: 71 MMHG | WEIGHT: 223 LBS | HEART RATE: 69 BPM | SYSTOLIC BLOOD PRESSURE: 130 MMHG

## 2020-01-30 DIAGNOSIS — R07.2 PRECORDIAL PAIN: ICD-10-CM

## 2020-01-30 DIAGNOSIS — I47.1 PSVT (PAROXYSMAL SUPRAVENTRICULAR TACHYCARDIA) (HCC): Primary | ICD-10-CM

## 2020-01-30 DIAGNOSIS — R00.2 PALPITATIONS: ICD-10-CM

## 2020-01-30 PROCEDURE — 93000 ELECTROCARDIOGRAM COMPLETE: CPT | Performed by: INTERNAL MEDICINE

## 2020-01-30 PROCEDURE — 99213 OFFICE O/P EST LOW 20 MIN: CPT | Performed by: INTERNAL MEDICINE

## 2020-02-05 RX ORDER — METOPROLOL SUCCINATE 50 MG/1
50 TABLET, EXTENDED RELEASE ORAL DAILY
Qty: 90 TABLET | Refills: 3 | Status: SHIPPED | OUTPATIENT
Start: 2020-02-05 | End: 2021-02-04

## 2020-07-29 ENCOUNTER — E-VISIT (OUTPATIENT)
Dept: FAMILY MEDICINE CLINIC | Facility: TELEHEALTH | Age: 65
End: 2020-07-29

## 2021-02-04 RX ORDER — METOPROLOL SUCCINATE 50 MG/1
TABLET, EXTENDED RELEASE ORAL
Qty: 90 TABLET | Refills: 0 | Status: SHIPPED | OUTPATIENT
Start: 2021-02-04 | End: 2021-04-28 | Stop reason: SDUPTHER

## 2021-03-19 ENCOUNTER — BULK ORDERING (OUTPATIENT)
Dept: CASE MANAGEMENT | Facility: OTHER | Age: 66
End: 2021-03-19

## 2021-03-19 DIAGNOSIS — Z23 IMMUNIZATION DUE: ICD-10-CM

## 2021-04-29 RX ORDER — METOPROLOL SUCCINATE 50 MG/1
50 TABLET, EXTENDED RELEASE ORAL DAILY
Qty: 90 TABLET | Refills: 0 | Status: SHIPPED | OUTPATIENT
Start: 2021-04-29 | End: 2021-07-12

## 2021-06-09 ENCOUNTER — OFFICE VISIT (OUTPATIENT)
Dept: CARDIOLOGY | Age: 66
End: 2021-06-09

## 2021-06-09 VITALS
BODY MASS INDEX: 24.88 KG/M2 | SYSTOLIC BLOOD PRESSURE: 124 MMHG | WEIGHT: 215 LBS | HEART RATE: 63 BPM | HEIGHT: 78 IN | DIASTOLIC BLOOD PRESSURE: 74 MMHG

## 2021-06-09 DIAGNOSIS — R94.31 ABNORMAL ELECTROCARDIOGRAM: Primary | ICD-10-CM

## 2021-06-09 DIAGNOSIS — I48.92 ATRIAL FLUTTER, UNSPECIFIED TYPE (HCC): ICD-10-CM

## 2021-06-09 DIAGNOSIS — R00.2 PALPITATIONS: ICD-10-CM

## 2021-06-09 DIAGNOSIS — Z95.818 STATUS POST PLACEMENT OF IMPLANTABLE LOOP RECORDER: ICD-10-CM

## 2021-06-09 DIAGNOSIS — R07.2 PRECORDIAL PAIN: ICD-10-CM

## 2021-06-09 PROCEDURE — 99213 OFFICE O/P EST LOW 20 MIN: CPT | Performed by: INTERNAL MEDICINE

## 2021-06-09 PROCEDURE — 93000 ELECTROCARDIOGRAM COMPLETE: CPT | Performed by: INTERNAL MEDICINE

## 2021-06-09 NOTE — PROGRESS NOTES
1 YEAR FOLLOW UP   Subjective:        Bipin Green Jr. is a 65 y.o. male who here for follow up    CC  PALPITATION AT NIGHT  HPI  65-year-old male with a history of cardiac arrhythmias palpitations has been complaining of the palpitations at night     Problems Addressed this Visit        Cardiac and Vasculature    Abnormal electrocardiogram - Primary    Relevant Orders    Treadmill Stress Test    Adult Transthoracic Echo Complete W/ Cont if Necessary Per Protocol    CBC & Differential    Comprehensive Metabolic Panel    Lipid Panel    TSH    Vitamin D 1,25 Dihydroxy    Cardiac arrhythmia    Relevant Orders    Treadmill Stress Test    Adult Transthoracic Echo Complete W/ Cont if Necessary Per Protocol    CBC & Differential    Comprehensive Metabolic Panel    Lipid Panel    TSH    Vitamin D 1,25 Dihydroxy    Palpitations    Relevant Orders    Treadmill Stress Test    Adult Transthoracic Echo Complete W/ Cont if Necessary Per Protocol    CBC & Differential    Comprehensive Metabolic Panel    Lipid Panel    TSH    Vitamin D 1,25 Dihydroxy    Status post placement of implantable loop recorder    Relevant Orders    Treadmill Stress Test    Adult Transthoracic Echo Complete W/ Cont if Necessary Per Protocol    CBC & Differential    Comprehensive Metabolic Panel    Lipid Panel    TSH    Vitamin D 1,25 Dihydroxy    Precordial pain    Relevant Orders    Treadmill Stress Test    Adult Transthoracic Echo Complete W/ Cont if Necessary Per Protocol    CBC & Differential    Comprehensive Metabolic Panel    Lipid Panel    TSH    Vitamin D 1,25 Dihydroxy      Diagnoses       Codes Comments    Abnormal electrocardiogram    -  Primary ICD-10-CM: R94.31  ICD-9-CM: 794.31     Atrial flutter, unspecified type (CMS/HCC)     ICD-10-CM: I48.92  ICD-9-CM: 427.32     Palpitations     ICD-10-CM: R00.2  ICD-9-CM: 785.1     Precordial pain     ICD-10-CM: R07.2  ICD-9-CM: 786.51     Status post placement of implantable loop recorder      "ICD-10-CM: Z95.818  ICD-9-CM: V45.09         .    The following portions of the patient's history were reviewed and updated as appropriate: allergies, current medications, past family history, past medical history, past social history, past surgical history and problem list.    Past Medical History:   Diagnosis Date   • Arrhythmia     PSVT, has loop recorder implant   • History of loop recorder    • Marfan syndrome    • Palpitations    • PSVT (paroxysmal supraventricular tachycardia) (CMS/HCC)    • PVC (premature ventricular contraction)    • SSS (sick sinus syndrome) (CMS/HCC)    • Syncope    • Vision decreased      reports that he has never smoked. He has never used smokeless tobacco. He reports that he does not drink alcohol and does not use drugs.   Family History   Problem Relation Age of Onset   • Heart disease Mother    • Cancer Father    • Heart disease Sister    • Atrial fibrillation Sister        Review of Systems  Constitutional: No wt loss, fever, fatigue  Gastrointestinal: No nausea, abdominal pain  Behavioral/Psych: No insomnia or anxiety   Cardiovascular palpitations no chest pain  Objective:       Physical Exam  /74   Pulse 63   Ht 205.7 cm (81\")   Wt 97.5 kg (215 lb)   BMI 23.04 kg/m²   General appearance: No acute changes   Neck: Trachea midline; NECK, supple, no thyromegaly or lymphadenopathy   Lungs: Normal size and shape, normal breath sounds, equal distribution of air, no rales and rhonchi   CV: S1-S2 regular, no murmurs, no rub, no gallop   Abdomen: Soft, non-tender; no masses , no abnormal abdominal sounds   Extremities: No deformity , normal color , no peripheral edema   Skin: Normal temperature, turgor and texture; no rash, ulcers            ECG 12 Lead    Date/Time: 6/9/2021 4:31 PM  Performed by: Adrienne Bowling MD  Authorized by: Adrienne Bowling MD   Comparison: compared with previous ECG   Similar to previous ECG  Rhythm: sinus rhythm  ST Flattening: " all    Clinical impression: non-specific ECG              Echocardiogram:        Current Outpatient Medications:   •  aspirin 81 MG tablet, Take 81 mg by mouth 2 (Two) Times a Day., Disp: , Rfl:   •  metoprolol succinate XL (TOPROL-XL) 50 MG 24 hr tablet, Take 1 tablet by mouth Daily., Disp: 90 tablet, Rfl: 0   Assessment:        Patient Active Problem List   Diagnosis   • Abnormal electrocardiogram   • Cardiac arrhythmia   • Supraventricular tachycardia (CMS/HCC)   • Syncope   • PSVT (paroxysmal supraventricular tachycardia) (CMS/HCC)   • Marfan syndrome   • Palpitations   • Status post placement of implantable loop recorder   • Encounter for loop recorder check   • Precordial pain               Plan:            ICD-10-CM ICD-9-CM   1. Abnormal electrocardiogram  R94.31 794.31   2. Atrial flutter, unspecified type (CMS/HCC)  I48.92 427.32   3. Palpitations  R00.2 785.1   4. Precordial pain  R07.2 786.51   5. Status post placement of implantable loop recorder  Z95.818 V45.09     1. Abnormal electrocardiogram    - Treadmill Stress Test  - Adult Transthoracic Echo Complete W/ Cont if Necessary Per Protocol  - CBC & Differential; Future  - Comprehensive Metabolic Panel; Future  - Lipid Panel; Future  - TSH; Future  - Vitamin D 1,25 Dihydroxy; Future    2. Atrial flutter, unspecified type (CMS/HCC)  Considering the patient's symptoms as well as clinical situation and  EKG findings, along with cardiac risk factors, ischemic workup is necessary to rule out ischemic cardiomyopathy, stress induced arrhythmias, and functional capacity for diagnosis as well as prognostic consideration    - Treadmill Stress Test  - Adult Transthoracic Echo Complete W/ Cont if Necessary Per Protocol  - CBC & Differential; Future  - Comprehensive Metabolic Panel; Future  - Lipid Panel; Future  - TSH; Future  - Vitamin D 1,25 Dihydroxy; Future    3. Palpitations    - Treadmill Stress Test  - Adult Transthoracic Echo Complete W/ Cont if Necessary  Per Protocol  - CBC & Differential; Future  - Comprehensive Metabolic Panel; Future  - Lipid Panel; Future  - TSH; Future  - Vitamin D 1,25 Dihydroxy; Future    4. Precordial pain    - Treadmill Stress Test  - Adult Transthoracic Echo Complete W/ Cont if Necessary Per Protocol  - CBC & Differential; Future  - Comprehensive Metabolic Panel; Future  - Lipid Panel; Future  - TSH; Future  - Vitamin D 1,25 Dihydroxy; Future    5. Status post placement of implantable loop recorder    - Treadmill Stress Test  - Adult Transthoracic Echo Complete W/ Cont if Necessary Per Protocol  - CBC & Differential; Future  - Comprehensive Metabolic Panel; Future  - Lipid Panel; Future  - TSH; Future  - Vitamin D 1,25 Dihydroxy; Future     ETT, ECHO, CBC, CMP. FLP. TSH, VITD        SEE IN 1 MONTH  COUNSELING:    Bipin Green Jr.Counseling was given to patient for the following topics: diagnostic results, risk factor reductions, impressions, risks and benefits of treatment options and importance of treatment compliance .       SMOKING COUNSELING:    [unfilled]    Dictated using Dragon dictation

## 2021-07-12 RX ORDER — METOPROLOL SUCCINATE 50 MG/1
50 TABLET, EXTENDED RELEASE ORAL DAILY
Qty: 90 TABLET | Refills: 0 | Status: SHIPPED | OUTPATIENT
Start: 2021-07-12 | End: 2021-09-24

## 2021-07-15 ENCOUNTER — APPOINTMENT (OUTPATIENT)
Dept: CARDIOLOGY | Facility: HOSPITAL | Age: 66
End: 2021-07-15

## 2021-08-02 ENCOUNTER — HOSPITAL ENCOUNTER (OUTPATIENT)
Dept: CARDIOLOGY | Facility: HOSPITAL | Age: 66
Discharge: HOME | End: 2021-08-02

## 2021-08-02 VITALS
BODY MASS INDEX: 25 KG/M2 | OXYGEN SATURATION: 98 % | DIASTOLIC BLOOD PRESSURE: 62 MMHG | WEIGHT: 216.05 LBS | HEART RATE: 78 BPM | SYSTOLIC BLOOD PRESSURE: 110 MMHG | HEIGHT: 78 IN

## 2021-08-02 VITALS — DIASTOLIC BLOOD PRESSURE: 64 MMHG | HEART RATE: 76 BPM | SYSTOLIC BLOOD PRESSURE: 130 MMHG

## 2021-08-02 DIAGNOSIS — R00.2 PALPITATIONS: ICD-10-CM

## 2021-08-02 DIAGNOSIS — R94.31 ABNORMAL ELECTROCARDIOGRAM: ICD-10-CM

## 2021-08-02 DIAGNOSIS — I48.92 ATRIAL FLUTTER, UNSPECIFIED TYPE (HCC): ICD-10-CM

## 2021-08-02 DIAGNOSIS — R07.2 PRECORDIAL PAIN: ICD-10-CM

## 2021-08-02 DIAGNOSIS — Z95.818 STATUS POST PLACEMENT OF IMPLANTABLE LOOP RECORDER: ICD-10-CM

## 2021-08-02 LAB
ALBUMIN SERPL-MCNC: 4.6 G/DL (ref 3.5–5.2)
ALBUMIN/GLOB SERPL: 1.3 G/DL
ALP SERPL-CCNC: 65 U/L (ref 39–117)
ALT SERPL W P-5'-P-CCNC: 17 U/L (ref 1–41)
ANION GAP SERPL CALCULATED.3IONS-SCNC: 9.3 MMOL/L (ref 5–15)
AORTIC ARCH: 2.3 CM
AORTIC DIMENSIONLESS INDEX: 0.9 (DI)
ASCENDING AORTA: 3.6 CM
AST SERPL-CCNC: 19 U/L (ref 1–40)
BASOPHILS # BLD AUTO: 0.05 10*3/MM3 (ref 0–0.2)
BASOPHILS NFR BLD AUTO: 0.8 % (ref 0–1.5)
BH CV ECHO MEAS - ACS: 2.1 CM
BH CV ECHO MEAS - AI DEC SLOPE: 288.1 CM/SEC^2
BH CV ECHO MEAS - AI MAX PG: 86.7 MMHG
BH CV ECHO MEAS - AI MAX VEL: 465.6 CM/SEC
BH CV ECHO MEAS - AI P1/2T: 473.3 MSEC
BH CV ECHO MEAS - AO ACC TIME: 0.13 SEC
BH CV ECHO MEAS - AO MAX PG (FULL): 2.4 MMHG
BH CV ECHO MEAS - AO MAX PG: 8.1 MMHG
BH CV ECHO MEAS - AO MEAN PG (FULL): 1.5 MMHG
BH CV ECHO MEAS - AO MEAN PG: 4.7 MMHG
BH CV ECHO MEAS - AO ROOT AREA (BSA CORRECTED): 1.2
BH CV ECHO MEAS - AO ROOT AREA: 6.8 CM^2
BH CV ECHO MEAS - AO ROOT DIAM: 2.9 CM
BH CV ECHO MEAS - AO V2 MAX: 142.5 CM/SEC
BH CV ECHO MEAS - AO V2 MEAN: 101.3 CM/SEC
BH CV ECHO MEAS - AO V2 VTI: 36.4 CM
BH CV ECHO MEAS - ASC AORTA: 3.5 CM
BH CV ECHO MEAS - AVA(I,A): 3.2 CM^2
BH CV ECHO MEAS - AVA(I,D): 3.2 CM^2
BH CV ECHO MEAS - AVA(V,A): 3.1 CM^2
BH CV ECHO MEAS - AVA(V,D): 3.1 CM^2
BH CV ECHO MEAS - BSA(HAYCOCK): 2.4 M^2
BH CV ECHO MEAS - BSA: 2.4 M^2
BH CV ECHO MEAS - BZI_BMI: 23.3 KILOGRAMS/M^2
BH CV ECHO MEAS - BZI_METRIC_HEIGHT: 205 CM
BH CV ECHO MEAS - BZI_METRIC_WEIGHT: 98 KG
BH CV ECHO MEAS - EDV(CUBED): 78.3 ML
BH CV ECHO MEAS - EDV(MOD-SP2): 137 ML
BH CV ECHO MEAS - EDV(MOD-SP4): 140 ML
BH CV ECHO MEAS - EDV(TEICH): 82.1 ML
BH CV ECHO MEAS - EF(CUBED): 72.3 %
BH CV ECHO MEAS - EF(MOD-BP): 56.7 %
BH CV ECHO MEAS - EF(MOD-SP2): 59.9 %
BH CV ECHO MEAS - EF(MOD-SP4): 55 %
BH CV ECHO MEAS - EF(TEICH): 64.3 %
BH CV ECHO MEAS - ESV(CUBED): 21.7 ML
BH CV ECHO MEAS - ESV(MOD-SP2): 55 ML
BH CV ECHO MEAS - ESV(MOD-SP4): 63 ML
BH CV ECHO MEAS - ESV(TEICH): 29.3 ML
BH CV ECHO MEAS - FS: 34.8 %
BH CV ECHO MEAS - IVS/LVPW: 0.92
BH CV ECHO MEAS - IVSD: 1 CM
BH CV ECHO MEAS - LAT PEAK E' VEL: 11.3 CM/SEC
BH CV ECHO MEAS - LV DIASTOLIC VOL/BSA (35-75): 58.5 ML/M^2
BH CV ECHO MEAS - LV MASS(C)D: 152.9 GRAMS
BH CV ECHO MEAS - LV MASS(C)DI: 64 GRAMS/M^2
BH CV ECHO MEAS - LV MAX PG: 5.7 MMHG
BH CV ECHO MEAS - LV MEAN PG: 3.2 MMHG
BH CV ECHO MEAS - LV SYSTOLIC VOL/BSA (12-30): 26.3 ML/M^2
BH CV ECHO MEAS - LV V1 MAX: 119.8 CM/SEC
BH CV ECHO MEAS - LV V1 MEAN: 85.1 CM/SEC
BH CV ECHO MEAS - LV V1 VTI: 31.8 CM
BH CV ECHO MEAS - LVIDD: 4.3 CM
BH CV ECHO MEAS - LVIDS: 2.8 CM
BH CV ECHO MEAS - LVLD AP2: 8.3 CM
BH CV ECHO MEAS - LVLD AP4: 9.5 CM
BH CV ECHO MEAS - LVLS AP2: 7.2 CM
BH CV ECHO MEAS - LVLS AP4: 8.5 CM
BH CV ECHO MEAS - LVOT AREA (M): 3.5 CM^2
BH CV ECHO MEAS - LVOT AREA: 3.6 CM^2
BH CV ECHO MEAS - LVOT DIAM: 2.1 CM
BH CV ECHO MEAS - LVPWD: 1.1 CM
BH CV ECHO MEAS - MED PEAK E' VEL: 6.7 CM/SEC
BH CV ECHO MEAS - MV A DUR: 0.17 SEC
BH CV ECHO MEAS - MV A MAX VEL: 85.4 CM/SEC
BH CV ECHO MEAS - MV DEC SLOPE: 346.2 CM/SEC^2
BH CV ECHO MEAS - MV DEC TIME: 246 SEC
BH CV ECHO MEAS - MV E MAX VEL: 57.2 CM/SEC
BH CV ECHO MEAS - MV E/A: 0.67
BH CV ECHO MEAS - MV MAX PG: 3.9 MMHG
BH CV ECHO MEAS - MV MEAN PG: 1.8 MMHG
BH CV ECHO MEAS - MV P1/2T MAX VEL: 101 CM/SEC
BH CV ECHO MEAS - MV P1/2T: 85.4 MSEC
BH CV ECHO MEAS - MV V2 MAX: 98.8 CM/SEC
BH CV ECHO MEAS - MV V2 MEAN: 62.1 CM/SEC
BH CV ECHO MEAS - MV V2 VTI: 34.3 CM
BH CV ECHO MEAS - MVA P1/2T LCG: 2.2 CM^2
BH CV ECHO MEAS - MVA(P1/2T): 2.6 CM^2
BH CV ECHO MEAS - MVA(VTI): 3.4 CM^2
BH CV ECHO MEAS - PA ACC TIME: 0.12 SEC
BH CV ECHO MEAS - PA MAX PG (FULL): 1.8 MMHG
BH CV ECHO MEAS - PA MAX PG: 2.4 MMHG
BH CV ECHO MEAS - PA PR(ACCEL): 25.1 MMHG
BH CV ECHO MEAS - PA V2 MAX: 77.4 CM/SEC
BH CV ECHO MEAS - PI END-D VEL: 69.5 CM/SEC
BH CV ECHO MEAS - PULM A REVS DUR: 0.08 SEC
BH CV ECHO MEAS - PULM A REVS VEL: 23.8 CM/SEC
BH CV ECHO MEAS - PULM DIAS VEL: 47.1 CM/SEC
BH CV ECHO MEAS - PULM S/D: 1.4
BH CV ECHO MEAS - PULM SYS VEL: 66.5 CM/SEC
BH CV ECHO MEAS - PVA(V,A): 1.7 CM^2
BH CV ECHO MEAS - PVA(V,D): 1.7 CM^2
BH CV ECHO MEAS - QP/QS: 0.22
BH CV ECHO MEAS - RAP SYSTOLE: 8 MMHG
BH CV ECHO MEAS - RV MAX PG: 0.61 MMHG
BH CV ECHO MEAS - RV MEAN PG: 0.33 MMHG
BH CV ECHO MEAS - RV V1 MAX: 39.1 CM/SEC
BH CV ECHO MEAS - RV V1 MEAN: 26.2 CM/SEC
BH CV ECHO MEAS - RV V1 VTI: 7.5 CM
BH CV ECHO MEAS - RVOT AREA: 3.4 CM^2
BH CV ECHO MEAS - RVOT DIAM: 2.1 CM
BH CV ECHO MEAS - RVSP: 29.8 MMHG
BH CV ECHO MEAS - SI(AO): 104 ML/M^2
BH CV ECHO MEAS - SI(CUBED): 23.7 ML/M^2
BH CV ECHO MEAS - SI(LVOT): 48.2 ML/M^2
BH CV ECHO MEAS - SI(MOD-SP2): 34.3 ML/M^2
BH CV ECHO MEAS - SI(MOD-SP4): 32.2 ML/M^2
BH CV ECHO MEAS - SI(TEICH): 22.1 ML/M^2
BH CV ECHO MEAS - SUP REN AO DIAM: 1.7 CM
BH CV ECHO MEAS - SV(AO): 248.6 ML
BH CV ECHO MEAS - SV(CUBED): 56.6 ML
BH CV ECHO MEAS - SV(LVOT): 115.3 ML
BH CV ECHO MEAS - SV(MOD-SP2): 82 ML
BH CV ECHO MEAS - SV(MOD-SP4): 77 ML
BH CV ECHO MEAS - SV(RVOT): 25.7 ML
BH CV ECHO MEAS - SV(TEICH): 52.8 ML
BH CV ECHO MEAS - TAPSE (>1.6): 1.8 CM
BH CV ECHO MEAS - TR MAX VEL: 233.5 CM/SEC
BH CV ECHO MEASUREMENTS AVERAGE E/E' RATIO: 6.36
BH CV STRESS BP STAGE 1: NORMAL
BH CV STRESS BP STAGE 2: NORMAL
BH CV STRESS DURATION MIN STAGE 1: 3
BH CV STRESS DURATION MIN STAGE 2: 1
BH CV STRESS DURATION SEC STAGE 1: 0
BH CV STRESS DURATION SEC STAGE 2: 58
BH CV STRESS GRADE STAGE 1: 10
BH CV STRESS GRADE STAGE 2: 12
BH CV STRESS HR STAGE 1: 129
BH CV STRESS HR STAGE 2: 150
BH CV STRESS METS STAGE 1: 4.6
BH CV STRESS METS STAGE 2: 6.1
BH CV STRESS PROTOCOL 1: NORMAL
BH CV STRESS RECOVERY BP: NORMAL MMHG
BH CV STRESS RECOVERY HR: 93 BPM
BH CV STRESS SPEED STAGE 1: 1.7
BH CV STRESS SPEED STAGE 2: 2.5
BH CV STRESS STAGE 1: 1
BH CV STRESS STAGE 2: 2
BH CV VAS BP LEFT ARM: NORMAL MMHG
BH CV XLRA - RV BASE: 3.3 CM
BH CV XLRA - RV LENGTH: 7.1 CM
BH CV XLRA - RV MID: 2.7 CM
BH CV XLRA - TDI S': 10.3 CM/SEC
BILIRUB SERPL-MCNC: 0.5 MG/DL (ref 0–1.2)
BUN SERPL-MCNC: 11 MG/DL (ref 8–23)
BUN/CREAT SERPL: 9.3 (ref 7–25)
CALCIUM SPEC-SCNC: 9.3 MG/DL (ref 8.6–10.5)
CHLORIDE SERPL-SCNC: 104 MMOL/L (ref 98–107)
CHOLEST SERPL-MCNC: 222 MG/DL (ref 0–200)
CO2 SERPL-SCNC: 24.7 MMOL/L (ref 22–29)
CREAT SERPL-MCNC: 1.18 MG/DL (ref 0.76–1.27)
DEPRECATED RDW RBC AUTO: 43.1 FL (ref 37–54)
EOSINOPHIL # BLD AUTO: 0.29 10*3/MM3 (ref 0–0.4)
EOSINOPHIL NFR BLD AUTO: 4.4 % (ref 0.3–6.2)
ERYTHROCYTE [DISTWIDTH] IN BLOOD BY AUTOMATED COUNT: 13.5 % (ref 12.3–15.4)
GFR SERPL CREATININE-BSD FRML MDRD: 62 ML/MIN/1.73
GLOBULIN UR ELPH-MCNC: 3.6 GM/DL
GLUCOSE SERPL-MCNC: 102 MG/DL (ref 65–99)
HCT VFR BLD AUTO: 41.5 % (ref 37.5–51)
HDLC SERPL-MCNC: 44 MG/DL (ref 40–60)
HGB BLD-MCNC: 14 G/DL (ref 13–17.7)
IMM GRANULOCYTES # BLD AUTO: 0.04 10*3/MM3 (ref 0–0.05)
IMM GRANULOCYTES NFR BLD AUTO: 0.6 % (ref 0–0.5)
LDLC SERPL CALC-MCNC: 147 MG/DL (ref 0–100)
LDLC/HDLC SERPL: 3.28 {RATIO}
LEFT ATRIUM VOLUME INDEX: 15.4 ML/M2
LYMPHOCYTES # BLD AUTO: 2.47 10*3/MM3 (ref 0.7–3.1)
LYMPHOCYTES NFR BLD AUTO: 37.3 % (ref 19.6–45.3)
MAXIMAL PREDICTED HEART RATE: 154 BPM
MAXIMAL PREDICTED HEART RATE: 154 BPM
MCH RBC QN AUTO: 29.4 PG (ref 26.6–33)
MCHC RBC AUTO-ENTMCNC: 33.7 G/DL (ref 31.5–35.7)
MCV RBC AUTO: 87.2 FL (ref 79–97)
MONOCYTES # BLD AUTO: 0.49 10*3/MM3 (ref 0.1–0.9)
MONOCYTES NFR BLD AUTO: 7.4 % (ref 5–12)
NEUTROPHILS NFR BLD AUTO: 3.29 10*3/MM3 (ref 1.7–7)
NEUTROPHILS NFR BLD AUTO: 49.5 % (ref 42.7–76)
NRBC BLD AUTO-RTO: 0 /100 WBC (ref 0–0.2)
PERCENT MAX PREDICTED HR: 98.05 %
PLATELET # BLD AUTO: 199 10*3/MM3 (ref 140–450)
PMV BLD AUTO: 9.3 FL (ref 6–12)
POTASSIUM SERPL-SCNC: 4.3 MMOL/L (ref 3.5–5.2)
PROT SERPL-MCNC: 8.2 G/DL (ref 6–8.5)
RBC # BLD AUTO: 4.76 10*6/MM3 (ref 4.14–5.8)
SINUS: 3.8 CM
SODIUM SERPL-SCNC: 138 MMOL/L (ref 136–145)
STJ: 3.3 CM
STRESS BASELINE BP: NORMAL MMHG
STRESS BASELINE HR: 71 BPM
STRESS PERCENT HR: 115 %
STRESS POST ESTIMATED WORKLOAD: 6.2 METS
STRESS POST EXERCISE DUR MIN: 4 MIN
STRESS POST EXERCISE DUR SEC: 58 SEC
STRESS POST PEAK BP: NORMAL MMHG
STRESS POST PEAK HR: 151 BPM
STRESS TARGET HR: 131 BPM
STRESS TARGET HR: 131 BPM
TRIGL SERPL-MCNC: 169 MG/DL (ref 0–150)
TSH SERPL DL<=0.05 MIU/L-ACNC: 2.24 UIU/ML (ref 0.27–4.2)
VLDLC SERPL-MCNC: 31 MG/DL (ref 5–40)
WBC # BLD AUTO: 6.63 10*3/MM3 (ref 3.4–10.8)

## 2021-08-02 PROCEDURE — 93306 TTE W/DOPPLER COMPLETE: CPT | Performed by: INTERNAL MEDICINE

## 2021-08-02 PROCEDURE — 93018 CV STRESS TEST I&R ONLY: CPT | Performed by: INTERNAL MEDICINE

## 2021-08-02 PROCEDURE — 93306 TTE W/DOPPLER COMPLETE: CPT

## 2021-08-02 PROCEDURE — 80053 COMPREHEN METABOLIC PANEL: CPT | Performed by: INTERNAL MEDICINE

## 2021-08-02 PROCEDURE — 80061 LIPID PANEL: CPT | Performed by: INTERNAL MEDICINE

## 2021-08-02 PROCEDURE — 85025 COMPLETE CBC W/AUTO DIFF WBC: CPT | Performed by: INTERNAL MEDICINE

## 2021-08-02 PROCEDURE — 84443 ASSAY THYROID STIM HORMONE: CPT | Performed by: INTERNAL MEDICINE

## 2021-08-02 PROCEDURE — 93017 CV STRESS TEST TRACING ONLY: CPT

## 2021-08-02 PROCEDURE — 36415 COLL VENOUS BLD VENIPUNCTURE: CPT | Performed by: INTERNAL MEDICINE

## 2021-08-02 PROCEDURE — 82652 VIT D 1 25-DIHYDROXY: CPT | Performed by: INTERNAL MEDICINE

## 2021-08-04 LAB — 1,25(OH)2D SERPL-MCNC: 28.5 PG/ML (ref 19.9–79.3)

## 2021-09-01 ENCOUNTER — OFFICE VISIT (OUTPATIENT)
Dept: CARDIOLOGY | Age: 66
End: 2021-09-01

## 2021-09-01 VITALS
BODY MASS INDEX: 24.76 KG/M2 | DIASTOLIC BLOOD PRESSURE: 74 MMHG | SYSTOLIC BLOOD PRESSURE: 129 MMHG | WEIGHT: 214 LBS | HEART RATE: 82 BPM | HEIGHT: 78 IN

## 2021-09-01 DIAGNOSIS — Z95.818 STATUS POST PLACEMENT OF IMPLANTABLE LOOP RECORDER: ICD-10-CM

## 2021-09-01 DIAGNOSIS — R00.2 PALPITATIONS: ICD-10-CM

## 2021-09-01 DIAGNOSIS — R07.2 PRECORDIAL PAIN: Primary | ICD-10-CM

## 2021-09-01 PROCEDURE — 99213 OFFICE O/P EST LOW 20 MIN: CPT | Performed by: INTERNAL MEDICINE

## 2021-09-01 NOTE — PROGRESS NOTES
Stress and Echo Results    Subjective:        Bipin Green Jr. is a 65 y.o. male who here for follow up    CC  Follow-up palpitations chest pain  HPI  65 years old male with known history of palpitations, loop recorder implantation as well as precordial chest pain here for the follow-up with no complaints of chest pains tightness heaviness or the pressure sensation occasional palpitations only recently had a stress test and echo which are normal     Problems Addressed this Visit        Cardiac and Vasculature    Palpitations    Status post placement of implantable loop recorder    Precordial pain - Primary      Diagnoses       Codes Comments    Precordial pain    -  Primary ICD-10-CM: R07.2  ICD-9-CM: 786.51     Status post placement of implantable loop recorder     ICD-10-CM: Z95.818  ICD-9-CM: V45.09     Palpitations     ICD-10-CM: R00.2  ICD-9-CM: 785.1         .    The following portions of the patient's history were reviewed and updated as appropriate: allergies, current medications, past family history, past medical history, past social history, past surgical history and problem list.    Past Medical History:   Diagnosis Date   • Arrhythmia     PSVT, has loop recorder implant   • History of loop recorder    • Marfan syndrome    • Palpitations    • PSVT (paroxysmal supraventricular tachycardia) (CMS/HCC)    • PVC (premature ventricular contraction)    • SSS (sick sinus syndrome) (CMS/HCC)    • Syncope    • Vision decreased      reports that he has never smoked. He has never used smokeless tobacco. He reports that he does not drink alcohol and does not use drugs.   Family History   Problem Relation Age of Onset   • Heart disease Mother    • Cancer Father    • Heart disease Sister    • Atrial fibrillation Sister        Review of Systems  Constitutional: No wt loss, fever, fatigue  Gastrointestinal: No nausea, abdominal pain  Behavioral/Psych: No insomnia or anxiety   Cardiovascular chest  "pain/palpitation  Objective:       Physical Exam  /74   Pulse 82   Ht 205.7 cm (81\")   Wt 97.1 kg (214 lb)   BMI 22.93 kg/m²   General appearance: No acute changes   Neck: Trachea midline; NECK, supple, no thyromegaly or lymphadenopathy   Lungs: Normal size and shape, normal breath sounds, equal distribution of air, no rales and rhonchi   CV: S1-S2 regular, no murmurs, no rub, no gallop   Abdomen: Soft, non-tender; no masses , no abnormal abdominal sounds   Extremities: No deformity , normal color , no peripheral edema   Skin: Normal temperature, turgor and texture; no rash, ulcers          Procedures      Echocardiogram:        Current Outpatient Medications:   •  aspirin 81 MG tablet, Take 81 mg by mouth 2 (Two) Times a Day., Disp: , Rfl:   •  metoprolol succinate XL (TOPROL-XL) 50 MG 24 hr tablet, Take 1 tablet by mouth Daily., Disp: 90 tablet, Rfl: 0   Assessment:        Patient Active Problem List   Diagnosis   • Abnormal electrocardiogram   • Cardiac arrhythmia   • Supraventricular tachycardia (CMS/HCC)   • Syncope   • PSVT (paroxysmal supraventricular tachycardia) (CMS/HCC)   • Marfan syndrome   • Palpitations   • Status post placement of implantable loop recorder   • Encounter for loop recorder check   • Precordial pain         Interpretation Summary       · The patient reported dizziness during the stress test.  · Arrhythmias were not significant during stress.  · No ECG evidence of myocardial ischemia.Negative clinical evidence of myocardial ischemia. Findings consistent with a normal ECG stress test     Interpretation Summary    · Calculated left ventricular EF = 56.7% Estimated left ventricular EF was in agreement with the calculated left ventricular EF.  · Left ventricular diastolic function was normal.  · There is no evidence of pericardial effusion. .              Plan:            ICD-10-CM ICD-9-CM   1. Precordial pain  R07.2 786.51   2. Status post placement of implantable loop recorder  " Z95.818 V45.09   3. Palpitations  R00.2 785.1     1. Precordial pain  Stress test is normal    2. Status post placement of implantable loop recorder  Stable    3. Palpitations  Under control       Specificity and sensitivity of the stress test/ cardiac workup has been explained. Pt has been explained if  Symptoms continue please go to ER, and further w/p will be required.    Also explained this does not rule out coronary artery disease or the future events, continue to emphasize on risk reductions for coronary artery disease    Pt also advised to contact PCP for other causes of symptoms    1 yr  COUNSELING:    Bipin Green Jr.Counseling was given to patient for the following topics: diagnostic results, risk factor reductions, impressions, risks and benefits of treatment options and importance of treatment compliance .       SMOKING COUNSELING:    [unfilled]    Dictated using Dragon dictation

## 2021-09-24 RX ORDER — METOPROLOL SUCCINATE 50 MG/1
TABLET, EXTENDED RELEASE ORAL
Qty: 90 TABLET | Refills: 1 | Status: SHIPPED | OUTPATIENT
Start: 2021-09-24 | End: 2022-09-07 | Stop reason: SDUPTHER

## 2022-09-07 ENCOUNTER — OFFICE VISIT (OUTPATIENT)
Dept: CARDIOLOGY | Age: 67
End: 2022-09-07

## 2022-09-07 VITALS
SYSTOLIC BLOOD PRESSURE: 118 MMHG | BODY MASS INDEX: 25.34 KG/M2 | HEART RATE: 76 BPM | DIASTOLIC BLOOD PRESSURE: 67 MMHG | WEIGHT: 219 LBS | HEIGHT: 78 IN

## 2022-09-07 DIAGNOSIS — R07.2 PRECORDIAL PAIN: ICD-10-CM

## 2022-09-07 DIAGNOSIS — R94.31 ABNORMAL ELECTROCARDIOGRAM: Primary | ICD-10-CM

## 2022-09-07 DIAGNOSIS — I47.1 PSVT (PAROXYSMAL SUPRAVENTRICULAR TACHYCARDIA): ICD-10-CM

## 2022-09-07 DIAGNOSIS — R00.2 PALPITATIONS: ICD-10-CM

## 2022-09-07 PROCEDURE — 93000 ELECTROCARDIOGRAM COMPLETE: CPT | Performed by: INTERNAL MEDICINE

## 2022-09-07 PROCEDURE — 99213 OFFICE O/P EST LOW 20 MIN: CPT | Performed by: INTERNAL MEDICINE

## 2022-09-07 RX ORDER — METOPROLOL SUCCINATE 50 MG/1
50 TABLET, EXTENDED RELEASE ORAL DAILY
Qty: 90 TABLET | Refills: 3 | Status: SHIPPED | OUTPATIENT
Start: 2022-09-07

## 2022-09-07 NOTE — PROGRESS NOTES
1 YR FOLLOW UP    Subjective:        Bipin Green Jr. is a 66 y.o. male who here for follow up    CC  Follow-up abnormal EKG PSVT palpitations chest pain  HPI  66-year-old male with abnormal EKG paroxysmal supraventricular tachycardia, palpitations as well as a precordial pain here for the follow-up with no complaints of chest pains tightness heaviness or the pressure sensation     Problems Addressed this Visit        Cardiac and Vasculature    Abnormal electrocardiogram - Primary    PSVT (paroxysmal supraventricular tachycardia) ([facility])    Palpitations    Precordial pain      Diagnoses       Codes Comments    Abnormal electrocardiogram    -  Primary ICD-10-CM: R94.31  ICD-9-CM: 794.31     PSVT (paroxysmal supraventricular tachycardia) ([facility])     ICD-10-CM: I47.1  ICD-9-CM: 427.0     Palpitations     ICD-10-CM: R00.2  ICD-9-CM: 785.1     Precordial pain     ICD-10-CM: R07.2  ICD-9-CM: 786.51         .    The following portions of the patient's history were reviewed and updated as appropriate: allergies, current medications, past family history, past medical history, past social history, past surgical history and problem list.    Past Medical History:   Diagnosis Date   • Arrhythmia     PSVT, has loop recorder implant   • History of loop recorder    • Marfan syndrome    • Palpitations    • PSVT (paroxysmal supraventricular tachycardia) ([facility])    • PVC (premature ventricular contraction)    • SSS (sick sinus syndrome) ([facility])    • Syncope    • Vision decreased      reports that he has never smoked. He has never used smokeless tobacco. He reports that he does not drink alcohol and does not use drugs.   Family History   Problem Relation Age of Onset   • Heart disease Mother    • Cancer Father    • Heart disease Sister    • Atrial fibrillation Sister        Review of Systems  Constitutional: No wt loss, fever, fatigue  Gastrointestinal: No nausea, abdominal pain  Behavioral/Psych: No insomnia or anxiety   Cardiovascular no  "chest pains or tightness in the chest  Objective:       Physical Exam  /67   Pulse 76   Ht 205.7 cm (81\")   Wt 99.3 kg (219 lb)   BMI 23.47 kg/m²   General appearance: No acute changes   Neck: Trachea midline; NECK, supple, no thyromegaly or lymphadenopathy   Lungs: Normal size and shape, normal breath sounds, equal distribution of air, no rales and rhonchi   CV: S1-S2 regular, no murmurs, no rub, no gallop   Abdomen: Soft, nontender; no masses , no abnormal abdominal sounds   Extremities: No deformity , normal color , no peripheral edema   Skin: Normal temperature, turgor and texture; no rash, ulcers            ECG 12 Lead    Date/Time: 9/7/2022 3:46 PM  Performed by: Adrienne Bowling MD  Authorized by: Adrienne Bowling MD   Comparison: compared with previous ECG   Similar to previous ECG  Rhythm: sinus rhythm  ST Flattening: all    Clinical impression: non-specific ECG              Echocardiogram:        Current Outpatient Medications:   •  aspirin 81 MG tablet, Take 81 mg by mouth 2 (Two) Times a Day., Disp: , Rfl:   •  metoprolol succinate XL (TOPROL-XL) 50 MG 24 hr tablet, TAKE 1 TABLET EVERY DAY, Disp: 90 tablet, Rfl: 1   Assessment:        Patient Active Problem List   Diagnosis   • Abnormal electrocardiogram   • Cardiac arrhythmia   • Supraventricular tachycardia (HCC)   • Syncope   • PSVT (paroxysmal supraventricular tachycardia) (HCC)   • Marfan syndrome   • Palpitations   • Status post placement of implantable loop recorder   • Encounter for loop recorder check   • Precordial pain               Plan:            ICD-10-CM ICD-9-CM   1. Abnormal electrocardiogram  R94.31 794.31   2. PSVT (paroxysmal supraventricular tachycardia) (HCC)  I47.1 427.0   3. Palpitations  R00.2 785.1   4. Precordial pain  R07.2 786.51     1. Abnormal electrocardiogram  No angina pectoris    2. PSVT (paroxysmal supraventricular tachycardia) (HCC)  Under control    3. Palpitations  Under control    4. " Precordial pain  Atypical       1 YR  COUNSELING:    Bipin Green Jr.Counseling was given to patient for the following topics: diagnostic results, risk factor reductions, impressions, risks and benefits of treatment options and importance of treatment compliance .       SMOKING COUNSELING:    [unfilled]    Dictated using Dragon dictation

## 2023-12-01 RX ORDER — METOPROLOL SUCCINATE 50 MG/1
50 TABLET, EXTENDED RELEASE ORAL DAILY
Qty: 30 TABLET | Refills: 0 | Status: SHIPPED | OUTPATIENT
Start: 2023-12-01

## 2024-01-02 RX ORDER — METOPROLOL SUCCINATE 50 MG/1
50 TABLET, EXTENDED RELEASE ORAL DAILY
Qty: 30 TABLET | Refills: 0 | OUTPATIENT
Start: 2024-01-02

## (undated) DEVICE — LOU PACE DEFIB: Brand: MEDLINE INDUSTRIES, INC.